# Patient Record
Sex: FEMALE
[De-identification: names, ages, dates, MRNs, and addresses within clinical notes are randomized per-mention and may not be internally consistent; named-entity substitution may affect disease eponyms.]

---

## 2021-05-05 ENCOUNTER — APPOINTMENT (OUTPATIENT)
Dept: ORTHOPEDIC SURGERY | Facility: CLINIC | Age: 71
End: 2021-05-05
Payer: MEDICARE

## 2021-05-05 VITALS
DIASTOLIC BLOOD PRESSURE: 77 MMHG | WEIGHT: 154 LBS | SYSTOLIC BLOOD PRESSURE: 113 MMHG | HEART RATE: 99 BPM | HEIGHT: 65 IN | BODY MASS INDEX: 25.66 KG/M2 | OXYGEN SATURATION: 98 %

## 2021-05-05 DIAGNOSIS — M75.30 CALCIFIC TENDINITIS OF UNSPECIFIED SHOULDER: ICD-10-CM

## 2021-05-05 PROCEDURE — 99203 OFFICE O/P NEW LOW 30 MIN: CPT | Mod: 25

## 2021-05-05 PROCEDURE — 20610 DRAIN/INJ JOINT/BURSA W/O US: CPT | Mod: RT

## 2021-05-05 PROCEDURE — 73564 X-RAY EXAM KNEE 4 OR MORE: CPT | Mod: RT

## 2021-05-05 PROCEDURE — 99072 ADDL SUPL MATRL&STAF TM PHE: CPT

## 2021-05-05 RX ORDER — OMEPRAZOLE 20 MG/1
20 CAPSULE, DELAYED RELEASE ORAL
Refills: 0 | Status: ACTIVE | COMMUNITY

## 2021-05-05 RX ORDER — RISEDRONATE SODIUM 5 MG/1
5 TABLET, FILM COATED ORAL
Refills: 0 | Status: ACTIVE | COMMUNITY

## 2021-05-05 NOTE — PROCEDURE
[Injection] : Injection [Right] : of the right [Knee Joint] : knee joint [Osteoarthritis] : Osteoarthritis [Patient] : patient [Risk] : risk [Benefits] : benefits [Alternatives] : alternatives [Bleeding] : bleeding [Infection] : infection [Allergic Reaction] : allergic reaction [Verbal Consent Obtained] : verbal consent was obtained prior to the procedure [Alcohol] : Alcohol [Betadine] : Betadine [Ethyl Chloride Spray] : ethyl chloride spray was used as a topical anesthetic [Lateral] : lateral [Anterior] : anterior [22] : a 22-gauge [Same Needle/New Syringe] : the syringe was changed and the same needle was left in place and [1% Lidocaine___(mL)] : [unfilled] mL of 1% Lidocaine [Methylpred. 40mg/mL___(mL)] : [unfilled] mL of 40mg/mL methylprednisolone [Bandage Applied] : a bandage [Tolerated Well] : The patient tolerated the procedure well [None] : none [No Strenuous Activity___day(s)] : avoid strenuous activity for [unfilled] day(s) [___ Month(s)] : in [unfilled] month(s)

## 2021-06-01 NOTE — HISTORY OF PRESENT ILLNESS
[de-identified] : Ms. Velasco is a 71 yo woman who presents for pain in her right knee that began she fell in March\par She has had arthritis in this knee for 10 years.  She has been taking Tylenol for pain.  She did have an injection in her knee many years ago.  She has not done any physical therapy or any treatment recently.\par Pain is severe recently worse with walking and bending.

## 2021-06-01 NOTE — ASSESSMENT
[FreeTextEntry1] : 69 yo right knee acute pain and limping with underlying moderately severe osteoarthritis.  She could have degenerative meniscus tear or some bone bruising in addition to the underlying arthritis given the severity of pain on her right knee compared to the left and both knees have about the same amount of osteoarthritis.\par Today I did a steroid injection in the right knee which hopefully will give her some relief.  She certainly had some relief from the lidocaine.  She was given a cane.  Heat and ice.  She was referred to physical therapy and given home exercises.\par We discussed possible HA injections and ultimately knee replacement for severe OA and pain.\par F/U in about 4 wks to check her knee

## 2021-06-01 NOTE — PHYSICAL EXAM
[LE] : Sensory: Intact in bilateral lower extremities [PT] : posterior tibial 2+ and symmetric bilaterally [DP] : dorsalis pedis 2+ and symmetric bilaterally [Normal RLE] : Right Lower Extremity: No scars, rashes, lesions, ulcers, skin intact [Normal LLE] : Left Lower Extremity: No scars, rashes, lesions, ulcers, skin intact [Normal Touch] : sensation intact for touch [Normal] : Oriented to person, place, and time, insight and judgement were intact and the affect was normal [de-identified] : No respiratory distress or cough [de-identified] : Knees\par Antalgic gait.\par Tender right knee medial and lateral joint lines and patella facets.  More minimally tender left knee medial joint line and patella.\par No effusion.\par Knee range of motion is from 10 degree flexion contracture to 125 to 130 degrees on the right with pain on extension.  Left knee range of motion is from 0-1 125 to 130 degrees without pain.\par Mild pain with Brown right knee.\par Ia Lachman.  Normal anterior posterior drawer and varus valgus laxity.\par Motor and sensation are intact distally.\par  [de-identified] : \par x-rays of bilateral knees WB taken today shows bilateral knee osteoarthritis moderately severe medial and patellofemoral compartments with joint space narrowing and osteophytes.

## 2021-06-02 ENCOUNTER — APPOINTMENT (OUTPATIENT)
Dept: ORTHOPEDIC SURGERY | Facility: CLINIC | Age: 71
End: 2021-06-02
Payer: MEDICARE

## 2021-06-02 DIAGNOSIS — M17.11 UNILATERAL PRIMARY OSTEOARTHRITIS, RIGHT KNEE: ICD-10-CM

## 2021-06-02 PROCEDURE — 99213 OFFICE O/P EST LOW 20 MIN: CPT

## 2021-06-02 RX ORDER — RISEDRONATE SODIUM 35 MG/1
35 TABLET, FILM COATED ORAL
Qty: 8 | Refills: 0 | Status: ACTIVE | COMMUNITY
Start: 2021-05-20

## 2021-06-02 RX ORDER — HYALURONATE SODIUM 20 MG/2 ML
20 SYRINGE (ML) INTRAARTICULAR
Qty: 1 | Refills: 0 | Status: ACTIVE | OUTPATIENT
Start: 2021-06-02

## 2021-06-02 RX ORDER — DICLOFENAC SODIUM 1% 10 MG/G
1 GEL TOPICAL
Qty: 100 | Refills: 0 | Status: ACTIVE | COMMUNITY
Start: 2021-04-28

## 2021-06-02 NOTE — ASSESSMENT
[FreeTextEntry1] : 71 yo right knee pain and limping with underlying moderate osteoarthritis medial and PF.  She could have degenerative meniscus tear or some bone bruising in addition to the underlying arthritis given the severity of pain on her right knee compared to the left and both knees have about the same amount of osteoarthritis.\par She had some mild partial relief from the anti-inflammatories, steroid injection and physical therapy but has ongoing pain.  I suggested trying hyaluronic acid injections next since she has not gotten adequate pain relief from the other treatment.  I gave her a new cane today which she should use walking.  She should wear the sneakers.\par Continue Tylenol, meloxicam prn, home exercise program as she finishes formal PT. \par We will call her when we get the injections and otherwise she should follow-up in 6 weeks.\par

## 2021-06-02 NOTE — PHYSICAL EXAM
[LE] : Sensory: Intact in bilateral lower extremities [DP] : dorsalis pedis 2+ and symmetric bilaterally [PT] : posterior tibial 2+ and symmetric bilaterally [Normal RLE] : Right Lower Extremity: No scars, rashes, lesions, ulcers, skin intact [Normal LLE] : Left Lower Extremity: No scars, rashes, lesions, ulcers, skin intact [Normal Touch] : sensation intact for touch [Normal] : Oriented to person, place, and time, insight and judgement were intact and the affect was normal [de-identified] : Knees\par Antalgic gait.\par Tender right knee medial > lateral joint lines and patella facets.  More minimally tender left knee medial joint line and patella.\par No effusion.\par Knee range of motion is from 5-10 degree flexion contracture to 125 to 130 degrees on the right with pain on extension.  Left knee range of motion is from 0-125 to 130 degrees without pain.\par Mild pain with Brown right knee.\par Ia Lachman.  Normal anterior posterior drawer and varus valgus laxity.\par Motor and sensation are intact distally.\par  [de-identified] : No respiratory distress or cough [de-identified] : \par x-rays of bilateral knees WB taken 5/5/21 showed bilateral knee osteoarthritis moderately severe medial and patellofemoral compartments with joint space narrowing and osteophytes.

## 2021-06-02 NOTE — HISTORY OF PRESENT ILLNESS
[de-identified] : Ms. Velasco is a 69 yo woman who presents for right knee pain with OA.\par She takes Tylenol but not every day. She lost her cane. \par Steroid injection in her knee last visit helped and she has been going for PT. She does some exercises on her own.  She occasionally takes meloxicam when the pain is really bad.  She has not taken anything in the last 2 days.

## 2021-07-14 ENCOUNTER — APPOINTMENT (OUTPATIENT)
Dept: ORTHOPEDIC SURGERY | Facility: CLINIC | Age: 71
End: 2021-07-14
Payer: MEDICARE

## 2021-07-14 DIAGNOSIS — M25.561 PAIN IN RIGHT KNEE: ICD-10-CM

## 2021-07-14 DIAGNOSIS — M17.0 BILATERAL PRIMARY OSTEOARTHRITIS OF KNEE: ICD-10-CM

## 2021-07-14 PROCEDURE — 99213 OFFICE O/P EST LOW 20 MIN: CPT

## 2021-07-14 RX ORDER — HYALURONATE SODIUM 20 MG/2 ML
20 SYRINGE (ML) INTRAARTICULAR
Qty: 1 | Refills: 0 | Status: ACTIVE | OUTPATIENT
Start: 2021-07-14

## 2021-07-14 NOTE — ASSESSMENT
[FreeTextEntry1] : 71 yo right knee pain and limping with underlying moderate osteoarthritis medial and PF. She also has left patellofemoral arthritis moderate to severe which is not as symptomatic.\par She has been taking various NSAIDs and Tylenol as needed and did physical therapy and we did a steroid injection. She still has ongoing pain. Its not at a point she wants to consider knee replacement. We discussed hyaluronic acid injections again and will put an order through again to get these to see if this gives her relief of pain on the right knee. She will follow-up for the injections or prn

## 2021-07-14 NOTE — PHYSICAL EXAM
[LE] : Sensory: Intact in bilateral lower extremities [DP] : dorsalis pedis 2+ and symmetric bilaterally [PT] : posterior tibial 2+ and symmetric bilaterally [Normal RLE] : Right Lower Extremity: No scars, rashes, lesions, ulcers, skin intact [Normal LLE] : Left Lower Extremity: No scars, rashes, lesions, ulcers, skin intact [Normal Touch] : sensation intact for touch [Normal] : Oriented to person, place, and time, insight and judgement were intact and the affect was normal [de-identified] : Knees\par Antalgic gait. Gait is worse when she first gets up and starts walking and then improves after a few steps\par Tender right knee medial > lateral joint lines and patella facets.  More minimally tender left knee medial joint line and patella.\par No effusion.\par Knee range of motion is from 5-10 degree flexion contracture to 125 degrees on the right with pain on extension.  Left knee range of motion is from 0-125 to 130 degrees without pain.\par Mild pain with Brown right knee.\par Ia Lachman.  Normal anterior posterior drawer and varus valgus laxity.\par Motor and sensation are intact distally.\par  [de-identified] : No respiratory distress or cough [de-identified] : \par x-rays of bilateral knees WB taken 5/5/21 showed bilateral knee osteoarthritis moderately severe medial and patellofemoral compartments with joint space narrowing and osteophytes.

## 2021-07-14 NOTE — HISTORY OF PRESENT ILLNESS
[de-identified] : Ms. Velasco is a 69 yo woman who presents for right knee pain with OA.\par She takes Tylenol but not every day. She occasionally takes ibuprofen up to 800 mg once a day.\par Steroid injection in her knee was done in May this helped decrease the more severe pain but did not take away pain altogether.- . She went for PT. She does some exercises on her own.  \par She occasionally takes meloxicam when the pain is severe.

## 2025-03-12 NOTE — REASON FOR VISIT
[Follow-Up Visit] : a follow-up visit for [Knee Pain] : knee pain No. CELESTE screening performed.  STOP BANG Legend: 0-2 = LOW Risk; 3-4 = INTERMEDIATE Risk; 5-8 = HIGH Risk